# Patient Record
Sex: FEMALE | Race: WHITE | NOT HISPANIC OR LATINO | Employment: PART TIME | URBAN - METROPOLITAN AREA
[De-identification: names, ages, dates, MRNs, and addresses within clinical notes are randomized per-mention and may not be internally consistent; named-entity substitution may affect disease eponyms.]

---

## 2017-04-17 ENCOUNTER — DOCUMENTATION ONLY (OUTPATIENT)
Dept: TRANSPLANT | Facility: CLINIC | Age: 32
End: 2017-04-17

## 2017-04-17 NOTE — NURSING
Pt records reviewed.  Pt will be referred to Hepatology due to Hep C  Initial referral received  from Vinod Cabral FNP's  office.   Referral letter sent to provider and patient.  Pt records reviewed.

## 2017-04-17 NOTE — LETTER
April 17, 2017    Vinod Cabral  [address] [facility] [address]      Dear Dr. Cabral    Patient: Frannie Rodriguez   MR Number: [id number]   YOB: 1985     Thank you for the referral of Frannie Rodriguez to the [facility] program. An initial appointment will be scheduled for your patient with one of our Hepatologists.      Thank you again for your trust in our program.  If there is anything we can do for you or your staff, please feel free to contact us.        Sincerely,        [facility]  [address] [place] [id number]  [phone]

## 2017-04-17 NOTE — LETTER
April 17, 2017    [address]  [place] [address]      Dear Frannie Rodriguez:    Your doctor has referred you to the [facility]. You will be contacted by our office and an initial appointment will then be scheduled for you.    We look forward to seeing you soon. If you have any further questions, please contact us at [phone].       Sincerely,        [facility]   [address] [place] [address]  [phone]

## 2017-04-21 ENCOUNTER — TELEPHONE (OUTPATIENT)
Dept: HEPATOLOGY | Facility: CLINIC | Age: 32
End: 2017-04-21

## 2017-04-21 NOTE — TELEPHONE ENCOUNTER
External records reviewed. Positive HCV antibody on 4/4/17. Recent CBC, CMP, INR also noted.     Spoke to pt. Informed her that additional test needed to confirm active virus.  Lab order faxed to referring MD office for HCV genotype and HCV RNA. Pt will be called once results are received.

## 2017-05-02 DIAGNOSIS — B18.2 CHRONIC HEPATITIS C WITHOUT HEPATIC COMA: Primary | ICD-10-CM

## 2017-05-02 LAB
EXT HCV RNA QUANT PCR: NORMAL
HCV GENTYP SERPL NAA+PROBE: NORMAL
HCV LOG10: 6

## 2017-05-02 NOTE — TELEPHONE ENCOUNTER
External labs received. HCV RNA and genotype 1a noted, entered in Epic.    Spoke to pt. Labs confirm active virus. U/s, fibroscan, and clinic visit scheduled on 5/16. Reminders mailed.

## 2017-05-10 ENCOUNTER — TELEPHONE (OUTPATIENT)
Dept: HEPATOLOGY | Facility: CLINIC | Age: 32
End: 2017-05-10

## 2017-05-10 NOTE — TELEPHONE ENCOUNTER
----- Message from Krystle Antoine PA-C sent at 5/2/2017 12:41 PM CDT -----  Hep C type 1 confirmed, pls continue w/ preclinic labs, imaging & clinic

## 2017-05-16 ENCOUNTER — PROCEDURE VISIT (OUTPATIENT)
Dept: HEPATOLOGY | Facility: CLINIC | Age: 32
End: 2017-05-16
Attending: INTERNAL MEDICINE
Payer: MEDICAID

## 2017-05-16 ENCOUNTER — OFFICE VISIT (OUTPATIENT)
Dept: HEPATOLOGY | Facility: CLINIC | Age: 32
End: 2017-05-16
Payer: MEDICAID

## 2017-05-16 ENCOUNTER — HOSPITAL ENCOUNTER (OUTPATIENT)
Dept: RADIOLOGY | Facility: HOSPITAL | Age: 32
Discharge: HOME | End: 2017-05-16
Attending: INTERNAL MEDICINE
Payer: MEDICAID

## 2017-05-16 VITALS
DIASTOLIC BLOOD PRESSURE: 66 MMHG | WEIGHT: 208.75 LBS | SYSTOLIC BLOOD PRESSURE: 107 MMHG | HEIGHT: 64 IN | OXYGEN SATURATION: 99 % | HEART RATE: 75 BPM | BODY MASS INDEX: 35.64 KG/M2 | TEMPERATURE: 98 F

## 2017-05-16 DIAGNOSIS — B18.2 CHRONIC HEPATITIS C WITHOUT HEPATIC COMA: ICD-10-CM

## 2017-05-16 DIAGNOSIS — R76.8 HEPATITIS C ANTIBODY TEST POSITIVE: Primary | ICD-10-CM

## 2017-05-16 PROBLEM — M54.50 LOW BACK PAIN: Status: ACTIVE | Noted: 2017-05-16

## 2017-05-16 PROCEDURE — 99204 OFFICE O/P NEW MOD 45 MIN: CPT | Mod: S$PBB,,, | Performed by: PHYSICIAN ASSISTANT

## 2017-05-16 PROCEDURE — 91200 LIVER ELASTOGRAPHY: CPT | Mod: 26,S$PBB,, | Performed by: PHYSICIAN ASSISTANT

## 2017-05-16 PROCEDURE — 76700 US EXAM ABDOM COMPLETE: CPT | Mod: 26,,, | Performed by: RADIOLOGY

## 2017-05-16 PROCEDURE — 99213 OFFICE O/P EST LOW 20 MIN: CPT | Mod: PBBFAC,25 | Performed by: PHYSICIAN ASSISTANT

## 2017-05-16 PROCEDURE — 99999 PR PBB SHADOW E&M-EST. PATIENT-LVL III: CPT | Mod: PBBFAC,,, | Performed by: PHYSICIAN ASSISTANT

## 2017-05-16 RX ORDER — SERTRALINE HYDROCHLORIDE 50 MG/1
50 TABLET, FILM COATED ORAL
COMMUNITY
End: 2023-09-25 | Stop reason: SDUPTHER

## 2017-05-16 RX ORDER — AMLODIPINE BESYLATE 5 MG/1
5 TABLET ORAL
COMMUNITY
End: 2023-09-25 | Stop reason: SDUPTHER

## 2017-05-16 RX ORDER — QUETIAPINE FUMARATE 50 MG/1
50 TABLET, FILM COATED ORAL
COMMUNITY
End: 2023-10-12 | Stop reason: ALTCHOICE

## 2017-05-16 RX ORDER — PANTOPRAZOLE SODIUM 40 MG/1
40 TABLET, DELAYED RELEASE ORAL
COMMUNITY
End: 2023-10-12 | Stop reason: ALTCHOICE

## 2017-05-16 NOTE — PROGRESS NOTES
HEPATOLOGY CLINIC VISIT NOTE    REFERRING PROVIDER:  Vinod Cabral FNP    CHIEF COMPLAINT: Hepatitis C    HISTORY: Patient is a 31 y.o. new to the hepatology clinic her for evaluation of Hepatitis C.    This is a new diagnosis for her.   She was undergoing evaluation for a medical trial  for her back pain  and it was noted on their routine bloodwork. Further evaluation by her pcp showed positive antibody to Hepatitis C and viral load of 992,342 IU/mL.   She was originally referred to [facility], but never heard back about appt, so called here to see if she can get seen. Drove from [facility] today for visit here w/ spouse.    Preclinic labs confirm genotype 1a HCV w/ viral load of 992,342 IU/mL (4-2017).    Fibroscan done today suggests minimal scarring at 6.8kPa = F0-1.   Abdominal Ultrasound =  Unremarkable.     alcohol assessment:  Wine on rare occasion maybe 1-2 glasses per month.     Pt has no complaints today and feels well he denies fevers, weight change,  lymphadenopathy,  Rashes, joint pains, dark urine, abdominal fluid accumulation, yellowing/jaundice of skin or eyes, vomiting of blood, black stool, confusion or  tremors.     H/o drug use has been clean x 8 months.  Previously h/o 2-3 month experiment w/ Crystal meth, THC, cocaine few times, IV meth then nothing for 8 yrs before.   Did  use IV drugs age [age]   --h/o blood transfusion: no   --tattoos: homemade as a teen     PMH/PSH : Entered into epic    Social history:   .   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   Her Mom being evaluated for elevated liver enzymes  2 uncles w/ alcoholic cirrhosis    ALLERGIES AND MEDICATIONS: reviewed in [facility]    OBGYN HISTORY:   LMP:   April 26  Tubal but have been  considering reversal to have one more child w/ current . .     ROS:   General: denies fever, chills, weight change, fatigue   Skin: denies rash, itching,  sores  HEENT: denies headaches, vision problems, blurring vision, hearing problems, rhinorrhea, stuffiness, sore throat, swollen neck  Respiratory: denies SOB, wheeze, cough, sputum, hemoptysis  CV: denies chest pain or palpatations  GI: + heartburn, on protonix 40mg  denies appetite change, nausea, vomiting, diarrhea,  hematechezia, jaundice,   hematemesis,  denies icteric illness, abdominal swelling, confusion.   : denies dark urine, dysuria  Musculoskeletal: + low back pain , denies muscle weakness, joint pains .     PE:  WDWN, NAD  Alert, oriented and pleasant.   There were no vitals filed for this visit.  HEENT: ncat, eomi, no scleral icterus; normal rom of neck, no lymphadenopathy  Heart: regular rate and rhythm  Lungs: clear bilaterally, chest symmetric with respirations. No wheezes rhonchi or rales.   Abdomen: + bs, ntnd. No organomegaly  Neuro/psych: no asterixis, oriented x3, mood and affect appropriate.   Skin: warm and dry, no c/c/e. No plata erythema.  No spider telangectasia     RECENT LABS:  No results found for: HEPAIGG    No results found for: HEPBSAG  No results found for: HEPBCAB  No results found for: HEPBSAB    There is no immunization history on file for this patient.    External labs dated 2017:  HBsAb: positive/ reactive  AST: 51  ALT 95  Albumin 4.2  Creatinine 0.6  bilitot  0.2    RECENT IMAGING:  Impression       No significant sonographic abnormality.  ______________________________________     Electronically signed by resident: OLEG NGUYỄN MD  Date: 17       PROCEDURES:   Fibroscan Procedure      Name: Frannie Rodriguez  Date of Procedure : 2017   :: Krystle Antoine PA-C  Diagnosis: HCV  Probe: M     Fibroscan readin.8 KPa     IQR/med:4 %     Fibrosis:F 0-1       ASSESSMENT:  32 yo  female w/   1. CHRONIC HEPATITIS C, GENOTYPE 1a, new diagnosis   Mildly elevated transaminases.  Normal synthetic liver function   Fibroscan = F0-1   HBsAb positive  indicating immunity      2. H/o drug abuse.   Clean now 8 months.                   EDUCATION DISCUSSION    The natural history of Hepatitis C, including potential progression to cirrhosis was reviewed.   We discussed the increased progression of liver disease secondary to alcohol use; patient was advised to avoid alcohol completely.   Transmission of Hepatitis C was reviewed, including possible sexual transmission. Spouse should be screened.   Risk of vertical transmission of Hepatitis C from mother to baby was reviewed. Children should be screened.   Patient should avoid sharing personal products such as razors, toothbrushes, etc.   We reviewed that vaccination against Hepatitis A is recommended if patient does not already have immunity.  Recommend avoiding raw seafood.  Limit acetaminophen to 2000mg daily.    Reviewed that her labs suggest genotype 1 Hepatitis C, the most common type   Reviewed that her scans suggests minimal scarring at 6.8kPa., which is good.   However, in light of the discussion about expanding her family I will look to try to treat her HCV at this time    Briefly reviewed regimens, dosing, lab monitoring and compliance.   Would favor zepatier regimen in light of her PPI use, unless NS5A mutations are present, would then consider Harvoni     PLAN:  1. Labs due: cbc,cmp, inr, hcvrna quant,  HIV (consented) HAVIGG, HBsAg HepBcore Ab.   Ferritin, beta hcg, NS5A, iron and TIBC, transferrin,ferritin, bood tox  (per insurance protocol)   Once labs are reviewed will looks to consider for Zepatier vs Harvoni regimen pending results of NS5A (given handouts on both regimens)   . Vaccines for Hepatitis A will be recommended if not immune  F/U appt: one year.

## 2017-05-16 NOTE — PATIENT INSTRUCTIONS
1. Screen children and spouse for Hepatitis C.   2.  See you back yearly       Understanding Hepatitis C (HCV)  Hepatitis means inflammation of the liver. There are many kinds of hepatitis. Some can be spread. Others are not. Hepatitis C virus (HCV) can be spread to others. It can lead to lifelong liver disease. This includes chronic hepatitis, cirrhosis, liver failure, and liver cancer.  Symptoms of hepatitis C  Most people notice no problems until they develop liver disease years later. Symptoms include the following:  · Flulike problems (fatigue, nausea, vomiting, diarrhea, and sore muscles and joints)  · Tenderness in the upper right abdomen  · Jaundice (yellowing skin)  · Swelling in the belly  · Itching  · Dark urine  How HCV spreads  HCV spreads through exposure to an infected persons blood. This is most likely to happen if:  · You used an infected needle (IV drug needles, tattoos, acupuncture needles, and body piercing)  · You had a needlestick injury in the hospital  · You shared personal care items such as razors  · You had sex without a condom with an infected person (a less common cause)  · You had a blood transfusion several years ago (blood is now screened for HCV)  Many people do not know how they were exposed to HCV.  Prevent the spread  No vaccine can prevent the spread of HCV and hepatitis C. Its up to you to keep others safe.  Do:  · Cover all skin breaks and sores yourself. If you need help, the person treating you should wear latex gloves.  · Use condoms during sex.  Dont:  · Dont donate blood, plasma, body organs, other body tissue, or sperm.  · Dont share needles.  · Dont share razors, toothbrushes, manicure tools, or other personal items.   Date Last Reviewed: 7/1/2016 © 2000-2016 [employer]. [address] [place] [address], [place] [address]. All rights reserved. This information is not intended as a substitute for professional medical care. Always follow your healthcare  professional's instructions.

## 2017-05-16 NOTE — MR AVS SNAPSHOT
[facility] - Hepatology  [address]  [place] [address]  Phone: [phone]  Fax: [phone]                  Frannie Rodriguez   2017 8:40 AM   Office Visit    Description:  Female : 1985   Provider:  Krystle Antoine PA-C   Department:  [facility]           Diagnoses this Visit        Comments    Hepatitis C antibody test positive    -  Primary     Chronic hepatitis C without hepatic coma                To Do List           Goals (5 Years of Data)     None      [facility]     [facility] On Call Nurse Care Line -  Assistance  Unless otherwise directed by your provider, please contact [facility], our nurse care line that is available for  assistance.     Registered nurses in the [facility] On Call Center provide: appointment scheduling, clinical advisement, health education, and other advisory services.  Call: [phone] (toll free)               Medications           Message regarding Medications     Verify the changes and/or additions to your medication regime listed below are the same as discussed with your clinician today.  If any of these changes or additions are incorrect, please notify your healthcare provider.             Verify that the below list of medications is an accurate representation of the medications you are currently taking.  If none reported, the list may be blank. If incorrect, please contact your healthcare provider. Carry this list with you in case of emergency.           Current Medications     amlodipine (NORVASC) 5 MG tablet Take 5 mg by mouth.    docusate sodium (COLACE) 50 MG capsule Take by mouth 2 (two) times daily.    pantoprazole (PROTONIX) 40 MG tablet Take 40 mg by mouth.    quetiapine (SEROQUEL) 50 MG tablet Take 50 mg by mouth.    sertraline (ZOLOFT) 50 MG tablet Take 50 mg by mouth.           Clinical Reference Information           Your Vitals Were     BP Pulse Temp Height    107/66 (BP Location: Left arm, Patient  "Position: Sitting, BP Method: Automatic) 75 97.6 °F (36.4 °C) (Oral) 5' 4" (1.626 m)    Weight Last Period SpO2 BMI    94.7 kg (208 lb 12.4 oz) 04/26/2017 (Exact Date) 99% 35.84 kg/m2      Blood Pressure          Most Recent Value    BP  107/66      Allergies as of 5/16/2017     Cymbalta [Duloxetine]      Immunizations Administered on Date of Encounter - 5/16/2017     None      Orders Placed During Today's Visit     Future Labs/Procedures Expected by Expires    HCV RNA NS5A RESISTANCE,NIKOS. 1  5/16/2017 7/15/2018    CBC auto differential  As directed 7/15/2018    Comprehensive metabolic panel  As directed 7/15/2018    Drugs of Abuse Screen, Blood  As directed 7/15/2018    Ferritin  As directed 7/15/2018    hCG, quantitative  As directed 7/15/2018    Hepatitis A antibody, IgG  As directed 7/15/2018    Hepatitis B core antibody, total  As directed 5/16/2018    Hepatitis B surface antigen  As directed 5/16/2018    Hepatitis C RNA, quantitative, PCR  As directed 7/15/2018    HIV-1 and HIV-2 antibodies  As directed 7/15/2018    Iron and TIBC  As directed 7/15/2018    Protime-INR  As directed 5/16/2018    Transferrin  As directed 7/15/2018      [facility] Sign-Up     Activating your [facility] account is as easy as 1-2-3!     1) Visit [email or web address], select Sign Up Now, enter this activation code and your date of birth, then select Next.  [email or web address]  Expires: 6/30/2017  9:29 AM      2) Create a username and password to use when you visit [facility] in the future and select a security question in case you lose your password and select Next.    3) Enter your e-mail address and click Sign Up!    Additional Information  If you have questions, please e-mail [email or web address] or call [phone] to talk to our [facility] staff. Remember, [facility] is NOT to be used for urgent needs. For medical emergencies, dial 911.         Instructions    1. Screen children and spouse for Hepatitis C.   2.  See you back yearly "       Understanding Hepatitis C (HCV)  Hepatitis means inflammation of the liver. There are many kinds of hepatitis. Some can be spread. Others are not. Hepatitis C virus (HCV) can be spread to others. It can lead to lifelong liver disease. This includes chronic hepatitis, cirrhosis, liver failure, and liver cancer.  Symptoms of hepatitis C  Most people notice no problems until they develop liver disease years later. Symptoms include the following:  · Flulike problems (fatigue, nausea, vomiting, diarrhea, and sore muscles and joints)  · Tenderness in the upper right abdomen  · Jaundice (yellowing skin)  · Swelling in the belly  · Itching  · Dark urine  How HCV spreads  HCV spreads through exposure to an infected persons blood. This is most likely to happen if:  · You used an infected needle (IV drug needles, tattoos, acupuncture needles, and body piercing)  · You had a needlestick injury in the hospital  · You shared personal care items such as razors  · You had sex without a condom with an infected person (a less common cause)  · You had a blood transfusion several years ago (blood is now screened for HCV)  Many people do not know how they were exposed to HCV.  Prevent the spread  No vaccine can prevent the spread of HCV and hepatitis C. Its up to you to keep others safe.  Do:  · Cover all skin breaks and sores yourself. If you need help, the person treating you should wear latex gloves.  · Use condoms during sex.  Dont:  · Dont donate blood, plasma, body organs, other body tissue, or sperm.  · Dont share needles.  · Dont share razors, toothbrushes, manicure tools, or other personal items.   Date Last Reviewed: 7/1/2016 © 2000-2016 [employer]. [address] [place] [address], [place] [address]. All rights reserved. This information is not intended as a substitute for professional medical care. Always follow your healthcare professional's instructions.             Smoking Cessation     If you would like  to quit smoking:   You may be eligible for free services if you are a [place] resident and started smoking cigarettes before September 1, 1988.  Call the Smoking Cessation Trust (SCT) toll free at (697) 941-1642 or (406) 142-8853.   Call 1-800-QUIT-NOW if you do not meet the above criteria.   Contact us via email: [email or web address]   View our website for more information: [email or web address]        Language Assistance Services     ATTENTION: Language assistance services are available, free of charge. Please call [phone].      ATENCIÓN: Si habla español, tiene a silva disposición servicios gratuitos de asistencia lingüística. Llame al [phone].     CHÚ Ý: N?u b?n nói Ti?ng Vi?t, có các d?ch v? h? tr? ngôn ng? mi?n phí dành cho b?n. G?i s? [phone].         [facility] complies with applicable Federal civil rights laws and does not discriminate on the basis of race, color, national origin, age, disability, or sex.

## 2017-05-16 NOTE — PROCEDURES
Procedures Fibroscan Procedure     Name: Frannie Rodriguez  Date of Procedure : 2017   :: Krystle Antoine PA-C  Diagnosis: HCV  Probe: M    Fibroscan readin.8 KPa    IQR/med:4 %    Fibrosis:F 0-1

## 2017-05-16 NOTE — LETTER
May 16, 2017      Vinod Cabral  [address] [place] [address]           [facility]  [address]  [facility] [address]  Phone: [phone]  Fax: [phone]          Patient: Frannie Rodriguez   MR Number: [id number]   YOB: 1985   Date of Visit: 5/16/2017       Dear Vinod Cabral:    Thank you for referring Frannie Rodriguez to me for evaluation. Attached you will find relevant portions of my assessment and plan of care.    If you have questions, please do not hesitate to call me. I look forward to following Frannie Rodriguez along with you.    Sincerely,    Krystle Antoine PA-C    Enclosure  CC:  No Recipients    If you would like to receive this communication electronically, please contact [email or web address] or [phone] to request more information on [facility] access.    For providers and/or their staff who would like to refer a patient to [facility], please contact us through our one-stop-shop provider referral line, [facility] , at [phone].    If you feel you have received this communication in error or would no longer like to receive these types of communications, please e-mail [email or web address]

## 2017-05-23 ENCOUNTER — EPISODE CHANGES (OUTPATIENT)
Dept: HEPATOLOGY | Facility: CLINIC | Age: 32
End: 2017-05-23

## 2017-05-23 ENCOUNTER — TELEPHONE (OUTPATIENT)
Dept: PHARMACY | Facility: CLINIC | Age: 32
End: 2017-05-23

## 2017-05-23 ENCOUNTER — TELEPHONE (OUTPATIENT)
Dept: HEPATOLOGY | Facility: CLINIC | Age: 32
End: 2017-05-23

## 2017-05-23 DIAGNOSIS — B18.2 CHRONIC HEPATITIS C WITHOUT HEPATIC COMA: Primary | ICD-10-CM

## 2017-05-23 NOTE — TELEPHONE ENCOUNTER
I spoke with patient and message from BRIAN Antoine relayed.  She wants to have Hep A vaccine series done locally.  Order will be mailed to patient to have series done.

## 2017-05-23 NOTE — TELEPHONE ENCOUNTER
Hepatitis A Antibody IgG   Date Value Ref Range Status   05/16/2017 Negative  Final       Hepatitis B Surface Ag   Date Value Ref Range Status   05/16/2017 Negative  Final     Hep B Core Total Ab   Date Value Ref Range Status   05/16/2017 Negative  Final     No results found for: HEPBSAB    There is no immunization history on file for this patient.     Please call pt.   1. She has immunity to hep B but not Hep A so i do recommend those vaccines.   2.  i have sent Rx for Hep C treatment to pharmacy.  Based on labs i sent over ZEPATIER.  She should have this handout from clinic.   Otherwise labs are stable, pharmacy will be in touch w/ insurance decision.

## 2017-05-25 NOTE — TELEPHONE ENCOUNTER
Faxed prior authorization for Zepatier to insurance company for review on Thurs 05/25/2017 @10:19am RODO

## 2017-05-30 NOTE — TELEPHONE ENCOUNTER
Zepatier PA DENIED on 5/26/17  Because:  A.  Patient does not have advanced liver disease stage 3 or 4 hepatic fibrosis)    B.  he/she must be evaluated for treatment readiness using one of the following scales or assessment tools:    (i). Psychosocial Readiness Evaluation and Preparation for Hepatitis C Treatment (PREP-C) available at www.prepc.org  (ii). [facility] ? Drug & Alcohol Screening Tools available at http://www.integration.[facility].gov/clinical-practice/screening-tools#drugs.     Physician/provider must indicate the name of the [facility] drug and alcohol screening tool used.    The following are also required:  1. Provider assertion that patient demonstrates treatment readiness  2. Provider assertion that patient has the ability to adhere to the treatment regimen.     Routing provider to advise.

## 2017-06-16 NOTE — TELEPHONE ENCOUNTER
Please call pt.   Insurance is requesting that we fill out an online questionaire about hep C treatment.   pls arrange for her to come to a clinic visit to complete this

## 2017-06-21 ENCOUNTER — TELEPHONE (OUTPATIENT)
Dept: HEPATOLOGY | Facility: CLINIC | Age: 32
End: 2017-06-21

## 2017-06-21 NOTE — TELEPHONE ENCOUNTER
----- Message from David Schilling sent at 6/21/2017 12:56 PM CDT -----  Contact: Patient  Patient called in requesting to reschedule her appointment for a Wednesday preferably 6/28 however there wasn't anything soon enough. So if you can possibly schedule patient for June 28th please call her at [phone].

## 2017-06-21 NOTE — TELEPHONE ENCOUNTER
Called pt and rescheduled her appt per pts request to 6/28/17 Mary Ann scheduled pt for this today.

## 2017-07-07 NOTE — TELEPHONE ENCOUNTER
Attempted to speak with pt. Left message asking that she call back to r/s appts asap. Given direct number.

## 2017-07-11 NOTE — TELEPHONE ENCOUNTER
Spoke to pt.   Offered appt this week but pt cannot come. Next available is 7/27. Pt scheduled.     Please advise if drug screen screen/ any other labs need to repeated for PA.

## 2017-07-11 NOTE — TELEPHONE ENCOUNTER
Following up on this.    Any word from Ms Rodriguez?      Nikhil Aquino, PharmD, BCPS  [facility]  [phone]

## 2017-07-12 DIAGNOSIS — B18.2 CHRONIC HEPATITIS C WITHOUT HEPATIC COMA: Primary | ICD-10-CM

## 2017-07-21 ENCOUNTER — EPISODE CHANGES (OUTPATIENT)
Dept: HEPATOLOGY | Facility: CLINIC | Age: 32
End: 2017-07-21

## 2017-07-31 NOTE — TELEPHONE ENCOUNTER
Attempt made to reach patient for scheduling.  LVM and sent letter requesting that she call us back for rescheduling.

## 2017-10-05 ENCOUNTER — EPISODE CHANGES (OUTPATIENT)
Dept: HEPATOLOGY | Facility: CLINIC | Age: 32
End: 2017-10-05

## 2017-10-05 ENCOUNTER — TELEPHONE (OUTPATIENT)
Dept: HEPATOLOGY | Facility: CLINIC | Age: 32
End: 2017-10-05

## 2017-10-05 NOTE — TELEPHONE ENCOUNTER
Patient a no-show for F/u with BRIAN Antoine and tox appt sched 7/27/17.  I spoke with her today.  Appts resched to 10/19/17; reminder notice mailed.

## 2017-10-27 ENCOUNTER — TELEPHONE (OUTPATIENT)
Dept: HEPATOLOGY | Facility: CLINIC | Age: 32
End: 2017-10-27

## 2017-10-27 NOTE — TELEPHONE ENCOUNTER
----- Message from Bernadette Ford MA sent at 10/27/2017  3:45 PM CDT -----      ----- Message -----  From: Vinny Prince  Sent: 10/27/2017   3:24 PM  To: [facility] Non-Clinical Staff    Patient is calling to reschedule appt for Hept. That was scheduled on 10/19/17.  Please call [phone]    Thanks

## 2017-11-08 ENCOUNTER — TELEPHONE (OUTPATIENT)
Dept: HEPATOLOGY | Facility: CLINIC | Age: 32
End: 2017-11-08

## 2017-11-08 NOTE — TELEPHONE ENCOUNTER
----- Message from Claudine Pastor sent at 11/8/2017  9:57 AM CST -----  Contact: Pt  Pt would like to reschedule her 11/21 appt     Contact number [phone]    Thanks

## 2023-02-14 NOTE — TELEPHONE ENCOUNTER
Patient needs to complete prepc questionnaire and repeat drug screen.  Attempt made to reach her by phone.  LVM asking that she call us back for scheduling and sent her a letter stating the same.   ambulatory

## 2023-10-27 PROBLEM — L98.8 FISTULA: Status: ACTIVE | Noted: 2023-10-27

## 2023-12-02 PROBLEM — K02.9 DENTAL CARIES EXTENDING INTO PULP: Status: ACTIVE | Noted: 2023-12-02
